# Patient Record
Sex: FEMALE | Race: OTHER | HISPANIC OR LATINO | URBAN - METROPOLITAN AREA
[De-identification: names, ages, dates, MRNs, and addresses within clinical notes are randomized per-mention and may not be internally consistent; named-entity substitution may affect disease eponyms.]

---

## 2017-08-08 ENCOUNTER — EMERGENCY (EMERGENCY)
Facility: HOSPITAL | Age: 22
LOS: 1 days | Discharge: HOME | End: 2017-08-08
Attending: EMERGENCY MEDICINE
Payer: COMMERCIAL

## 2017-08-08 VITALS
TEMPERATURE: 101 F | DIASTOLIC BLOOD PRESSURE: 86 MMHG | OXYGEN SATURATION: 100 % | SYSTOLIC BLOOD PRESSURE: 134 MMHG | HEART RATE: 99 BPM | WEIGHT: 156.97 LBS | RESPIRATION RATE: 20 BRPM | HEIGHT: 61 IN

## 2017-08-08 VITALS
OXYGEN SATURATION: 100 % | SYSTOLIC BLOOD PRESSURE: 136 MMHG | HEART RATE: 94 BPM | TEMPERATURE: 98 F | RESPIRATION RATE: 16 BRPM | DIASTOLIC BLOOD PRESSURE: 82 MMHG

## 2017-08-08 DIAGNOSIS — R50.9 FEVER, UNSPECIFIED: ICD-10-CM

## 2017-08-08 DIAGNOSIS — R10.9 UNSPECIFIED ABDOMINAL PAIN: ICD-10-CM

## 2017-08-08 DIAGNOSIS — R05 COUGH: ICD-10-CM

## 2017-08-08 LAB
APPEARANCE UR: CLEAR — SIGNIFICANT CHANGE UP
BACTERIA # UR AUTO: ABNORMAL /HPF
BILIRUB UR-MCNC: NEGATIVE — SIGNIFICANT CHANGE UP
COLOR SPEC: YELLOW — SIGNIFICANT CHANGE UP
DIFF PNL FLD: ABNORMAL
EPI CELLS # UR: ABNORMAL (ref 0–10)
GLUCOSE UR QL: NEGATIVE — SIGNIFICANT CHANGE UP
HCG UR QL: NEGATIVE — SIGNIFICANT CHANGE UP
KETONES UR-MCNC: NEGATIVE — SIGNIFICANT CHANGE UP
LEUKOCYTE ESTERASE UR-ACNC: ABNORMAL
NITRITE UR-MCNC: NEGATIVE — SIGNIFICANT CHANGE UP
PH UR: 5 — SIGNIFICANT CHANGE UP (ref 5–8)
PROT UR-MCNC: 15
RBC CASTS # UR COMP ASSIST: ABNORMAL /HPF (ref 0–2)
SP GR SPEC: 1.01 — SIGNIFICANT CHANGE UP (ref 1.01–1.02)
UROBILINOGEN FLD QL: NEGATIVE — SIGNIFICANT CHANGE UP
WBC UR QL: SIGNIFICANT CHANGE UP /HPF (ref 0–5)

## 2017-08-08 PROCEDURE — 81001 URINALYSIS AUTO W/SCOPE: CPT

## 2017-08-08 PROCEDURE — 99284 EMERGENCY DEPT VISIT MOD MDM: CPT

## 2017-08-08 PROCEDURE — 87081 CULTURE SCREEN ONLY: CPT

## 2017-08-08 PROCEDURE — 71046 X-RAY EXAM CHEST 2 VIEWS: CPT

## 2017-08-08 PROCEDURE — 81025 URINE PREGNANCY TEST: CPT

## 2017-08-08 PROCEDURE — 99283 EMERGENCY DEPT VISIT LOW MDM: CPT

## 2017-08-08 PROCEDURE — 71020: CPT | Mod: 26

## 2017-08-08 RX ORDER — ACETAMINOPHEN 500 MG
650 TABLET ORAL ONCE
Qty: 0 | Refills: 0 | Status: COMPLETED | OUTPATIENT
Start: 2017-08-08 | End: 2017-08-08

## 2017-08-08 RX ADMIN — Medication 650 MILLIGRAM(S): at 13:26

## 2017-08-08 NOTE — ED ADULT NURSE NOTE - OBJECTIVE STATEMENT
C/O FEVER , COUGH , SORETHROAT , LOWER ABDOMINAL PAIN SINCE AM TODAY. SEEN AND EXAMINED BY DR. CASTRO.CASE DISCUSSED BY DR. CASTRO WITH PATIENT , PELVIC EXAMINATION DONE BY DR. CASTRO , ASSISTED BY JULIO C LAGOS

## 2017-08-08 NOTE — ED PROVIDER NOTE - PLAN OF CARE
1. return for worsening symptoms or anything concerning to you  2. take all home meds as prescribed  3. follow up with your pmd call to make an appointment  4. follow up with gc and strep tests  5. Take Tylenol 650 mg every 6 hours as needed for pain.  6. return for any abdominal pain nausea vomiting neck pain headache.   7. see pmd or call [phone]

## 2017-08-08 NOTE — ED PROVIDER NOTE - MEDICAL DECISION MAKING DETAILS
23 y/o F pt w/ no significant PMHx presents to ED c/o subjective fever and dry cough x1 day. Pt also notes some throat pain and headache. Pt reports no sick contacts at home. Pt denies rhinorrhea, neck pain, nausea, vomiting, burning urination, rash. Pt does have some suprapubic pain that started around the same time. no vaginal bleeding or discharge. is sexually active with 1 partner uses condoms never had an std before. main complaint is cough and fevers. subsequently noticed some suprapubic pain after these symptoms started. tolerating PO normal appetite. Will obtain strep test, urine/hcg, xray r/o pna. given exam unlikely for any other intraabdominal process. will obtain pelvic exam to r/o cmt and adnexal masses. unlikely mono given exam and no hepatosplenomegaly. Jamey Lombardi M.D., Attending Physician 21 y/o F pt w/ no significant PMHx presents to ED c/o subjective fever and dry cough x1 day. Pt also notes some throat pain and headache. Pt reports no sick contacts at home. Pt denies rhinorrhea, neck pain, nausea, vomiting, burning urination, rash. Pt does have some suprapubic pain that started around the same time. no vaginal bleeding or discharge. is sexually active with 1 partner uses condoms never had an std before. main complaint is cough and fevers. subsequently noticed some suprapubic pain after these symptoms started. tolerating PO normal appetite. Will obtain strep test, urine/hcg, xray r/o pna. given exam unlikely for any other intraabdominal process. will obtain pelvic exam to r/o cmt and adnexal masses. unlikely mono given exam and no hepatosplenomegaly. no signs of rpa pta ludwigs or lemirres given exam. Jamey Lombardi M.D., Attending Physician

## 2017-08-08 NOTE — ED PROVIDER NOTE - CARE PLAN
Principal Discharge DX:	Fever  Instructions for follow-up, activity and diet:	1. return for worsening symptoms or anything concerning to you  2. take all home meds as prescribed  3. follow up with your pmd call to make an appointment  4. follow up with gc and strep tests  5. Take Tylenol 650 mg every 6 hours as needed for pain.  6. return for any abdominal pain nausea vomiting neck pain headache.   7. see pmd or call [phone]  Secondary Diagnosis:	Cough Principal Discharge DX:	Fever  Instructions for follow-up, activity and diet:	1. return for worsening symptoms or anything concerning to you  2. take all home meds as prescribed  3. follow up with your pmd call to make an appointment  4. follow up with gc and strep tests  5. Take Tylenol 650 mg every 6 hours as needed for pain.  6. return for any abdominal pain nausea vomiting neck pain headache.   7. see pmd or call [phone]  Secondary Diagnosis:	Cough

## 2017-08-08 NOTE — ED PROVIDER NOTE - PHYSICAL EXAMINATION
Constitutional: mild distress AAOx3  Eyes: PERRLA EOMI  Head: Normocephalic atraumatic  Mouth: MMM - enlarged tonsil with some erythema and a few pustules consistent with tonsilitis.   Cardiac: regular rate   Resp: Lungs CTAB  GI: Abd s/nt/nd - mild suprapubic ttp no rebound or guarding negative manjarrez/mcburney, negative rovsig/psoas no cvat no hepatosplenomegaly  Neuro: CN2-12 intact  Skin: No rashes Constitutional: mild distress AAOx3  Eyes: PERRLA EOMI  Head: Normocephalic atraumatic  Mouth: MMM - enlarged tonsil with some erythema and a few exudates consistent with tonsilitis. no signs of pta or rpa  normal ROM of neck  Cardiac: regular rate   Resp: Lungs CTAB  GI: Abd s/nt/nd - mild suprapubic ttp no rebound or guarding negative manjarrez/mcburney, negative rovsig/psoas no cvat no hepatosplenomegaly  Neuro: CN2-12 intact  Skin: No rashes

## 2017-08-08 NOTE — ED PROVIDER NOTE - OBJECTIVE STATEMENT
23 y/o F pt w/ no significant PMHx presents to ED c/o subjective fever and dry cough x1 day. Pt also notes some throat pain, abd pain and a headache. Pt reports no sick contacts at home. Pt denies rhinorrhea, neck pain, nausea, vomiting, burning urination, rash, or any other complaints. NKDA 23 y/o F pt w/ no significant PMHx presents to ED c/o subjective fever and dry cough x1 day. Pt also notes some throat pain and headache. Pt reports no sick contacts at home. Pt denies rhinorrhea, neck pain, nausea, vomiting, burning urination, rash. Pt does have some suprapubic pain that started around the same time. no vaginal bleeding or discharge. is sexually active with 1 partner uses condoms never had an std before. main complaint is cough and fevers. subsequently noticed some suprapubic pain after these symptoms started. tolerating PO normal appetite.

## 2017-08-08 NOTE — ED PROVIDER NOTE - PROGRESS NOTE DETAILS
vaginal exam [facility] (tech): normal external genitalia, cervical os closed, minimal physiologic discharge, gc/chlam sample taken. no adnexal tenderness or fullness. no cmt. Jamey Lombardi M.D., Attending Physician centor score of 2 + cough +fever no lymphadenopathy + exudate on tonsil patient feeling better. xray negative for pna. urine neg for uti. abd soft and non-tender. pt tolerating PO. discussed with patient that gc and strep will not come back today. centor score 2 discussed with patient that we can wait for culture to come back and if positive will send abx. pt agrees with this plan. repeat vital signs appropriate for d/c will follow up with pmd. Jamey Lombardi M.D., Attending Physician

## 2017-08-09 LAB
C TRACH RRNA SPEC QL NAA+PROBE: SIGNIFICANT CHANGE UP
N GONORRHOEA RRNA SPEC QL NAA+PROBE: SIGNIFICANT CHANGE UP
SPECIMEN SOURCE: SIGNIFICANT CHANGE UP

## 2017-10-06 ENCOUNTER — EMERGENCY (EMERGENCY)
Facility: HOSPITAL | Age: 22
LOS: 1 days | Discharge: HOME | End: 2017-10-06
Attending: EMERGENCY MEDICINE
Payer: COMMERCIAL

## 2017-10-06 VITALS
HEART RATE: 70 BPM | RESPIRATION RATE: 16 BRPM | TEMPERATURE: 98 F | HEIGHT: 61 IN | SYSTOLIC BLOOD PRESSURE: 125 MMHG | WEIGHT: 156.97 LBS | DIASTOLIC BLOOD PRESSURE: 64 MMHG | OXYGEN SATURATION: 99 %

## 2017-10-06 DIAGNOSIS — M54.2 CERVICALGIA: ICD-10-CM

## 2017-10-06 DIAGNOSIS — M54.6 PAIN IN THORACIC SPINE: ICD-10-CM

## 2017-10-06 DIAGNOSIS — R20.2 PARESTHESIA OF SKIN: ICD-10-CM

## 2017-10-06 LAB — HCG UR QL: NEGATIVE — SIGNIFICANT CHANGE UP

## 2017-10-06 PROCEDURE — 99283 EMERGENCY DEPT VISIT LOW MDM: CPT | Mod: 25

## 2017-10-06 PROCEDURE — 99284 EMERGENCY DEPT VISIT MOD MDM: CPT

## 2017-10-06 PROCEDURE — 96372 THER/PROPH/DIAG INJ SC/IM: CPT

## 2017-10-06 PROCEDURE — 81025 URINE PREGNANCY TEST: CPT

## 2017-10-06 RX ORDER — KETOROLAC TROMETHAMINE 30 MG/ML
30 SYRINGE (ML) INJECTION ONCE
Qty: 0 | Refills: 0 | Status: DISCONTINUED | OUTPATIENT
Start: 2017-10-06 | End: 2017-10-06

## 2017-10-06 RX ORDER — IBUPROFEN 200 MG
1 TABLET ORAL
Qty: 20 | Refills: 0 | OUTPATIENT
Start: 2017-10-06

## 2017-10-06 RX ADMIN — Medication 30 MILLIGRAM(S): at 20:34

## 2017-10-06 RX ADMIN — Medication 30 MILLIGRAM(S): at 21:28

## 2017-10-06 NOTE — ED PROVIDER NOTE - OBJECTIVE STATEMENT
21 y/o F pt with no significant PMHx presents to ED c/o neck and upper back pain x 1 month. Pt reports having some occasional tingling to her leg. Pt denies fever, chills, nausea, vomiting, diarrhea, abd pain, dysuria, urinary frequency, hematuria, urinary/bowel incontinence, heavy lifting, trauma, or any other complaints. NKDA.

## 2017-10-06 NOTE — ED PROVIDER NOTE - CHPI ED SYMPTOMS NEG
no fever, no chills, no nausea, no vomiting, no diarrhea, no abd pain, no dysuria, no urinary frequency, no hematuria, no urinary/bowel incontinence

## 2017-10-06 NOTE — ED PROVIDER NOTE - MEDICAL DECISION MAKING DETAILS
21 y/o F pt with neck and upper back pain x 1 month. Appears to be MSK. No red flags. Will give pain control and discharge home.

## 2021-01-26 NOTE — ED PROVIDER NOTE - RESPIRATORY, MLM
Hoping we can schedule another clinic appt to f/up this video visit.  Would like to do the Friday 1/29 1:30 PM visit for 'skin infection check'- clinic visit in the virtual appt slot.  She'll call to cancel it on Thur if sx's are getting better.  Thanks!! CW Breath sounds clear and equal bilaterally.